# Patient Record
Sex: FEMALE | Race: BLACK OR AFRICAN AMERICAN | Employment: STUDENT | URBAN - METROPOLITAN AREA
[De-identification: names, ages, dates, MRNs, and addresses within clinical notes are randomized per-mention and may not be internally consistent; named-entity substitution may affect disease eponyms.]

---
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
## 2024-04-15 PROBLEM — R51.9 ACUTE NONINTRACTABLE HEADACHE, UNSPECIFIED HEADACHE TYPE: Status: ACTIVE | Noted: 2024-04-15

## 2024-04-19 ENCOUNTER — TELEPHONE (OUTPATIENT)
Dept: PEDIATRIC NEUROLOGY | Age: 19
End: 2024-04-19

## 2024-05-03 ENCOUNTER — APPOINTMENT (OUTPATIENT)
Dept: NEUROSURGERY | Age: 19
End: 2024-05-03

## 2024-05-03 ENCOUNTER — APPOINTMENT (OUTPATIENT)
Dept: PEDIATRIC NEUROLOGY | Age: 19
End: 2024-05-03

## 2024-05-03 VITALS
WEIGHT: 89 LBS | SYSTOLIC BLOOD PRESSURE: 126 MMHG | BODY MASS INDEX: 17.94 KG/M2 | DIASTOLIC BLOOD PRESSURE: 68 MMHG | HEIGHT: 59 IN | HEART RATE: 65 BPM

## 2024-05-03 VITALS — WEIGHT: 89.95 LBS

## 2024-05-03 DIAGNOSIS — G91.9 HYDROCEPHALUS, UNSPECIFIED TYPE  (CMD): Primary | ICD-10-CM

## 2024-05-03 DIAGNOSIS — G40.309 EPILEPSY, GENERALIZED, CONVULSIVE  (CMD): Primary | ICD-10-CM

## 2024-05-03 DIAGNOSIS — G80.9 CEREBRAL PALSY, UNSPECIFIED TYPE  (CMD): ICD-10-CM

## 2024-05-03 PROCEDURE — 99024 POSTOP FOLLOW-UP VISIT: CPT | Performed by: NURSE PRACTITIONER

## 2024-05-03 ASSESSMENT — ENCOUNTER SYMPTOMS
CONSTITUTIONAL NEGATIVE: 1
EYES NEGATIVE: 1
SEIZURES: 1
GASTROINTESTINAL NEGATIVE: 1
HEMATOLOGIC/LYMPHATIC NEGATIVE: 1
RESPIRATORY NEGATIVE: 1

## 2024-05-06 ENCOUNTER — TELEPHONE (OUTPATIENT)
Dept: NEUROSURGERY | Age: 19
End: 2024-05-06

## 2024-06-04 ENCOUNTER — APPOINTMENT (OUTPATIENT)
Dept: NEUROSURGERY | Age: 19
End: 2024-06-04

## 2024-06-04 ENCOUNTER — HOSPITAL ENCOUNTER (OUTPATIENT)
Dept: CT IMAGING | Age: 19
Discharge: HOME | End: 2024-06-04
Attending: NURSE PRACTITIONER

## 2024-06-04 VITALS — WEIGHT: 89.29 LBS

## 2024-06-04 DIAGNOSIS — G91.9 HYDROCEPHALUS, UNSPECIFIED TYPE  (CMD): ICD-10-CM

## 2024-06-04 DIAGNOSIS — G91.9 HYDROCEPHALUS, UNSPECIFIED TYPE  (CMD): Primary | ICD-10-CM

## 2024-06-04 PROCEDURE — 70450 CT HEAD/BRAIN W/O DYE: CPT

## 2024-06-04 PROCEDURE — 99024 POSTOP FOLLOW-UP VISIT: CPT | Performed by: NEUROLOGICAL SURGERY

## 2024-06-04 RX ORDER — PLECANATIDE 3 MG/1
3 TABLET ORAL DAILY
COMMUNITY
Start: 2024-04-30

## 2024-06-04 RX ORDER — MONTELUKAST SODIUM 10 MG/1
10 TABLET ORAL NIGHTLY
COMMUNITY
Start: 2024-05-18

## 2024-06-04 RX ORDER — LEVOCETIRIZINE DIHYDROCHLORIDE 5 MG/1
5 TABLET, FILM COATED ORAL EVERY EVENING
COMMUNITY
Start: 2024-05-29

## 2024-06-04 ASSESSMENT — ENCOUNTER SYMPTOMS
RESPIRATORY NEGATIVE: 1
HEMATOLOGIC/LYMPHATIC NEGATIVE: 1
GASTROINTESTINAL NEGATIVE: 1
EYES NEGATIVE: 1
CONSTITUTIONAL NEGATIVE: 1
ENDOCRINE NEGATIVE: 1
PSYCHIATRIC NEGATIVE: 1
ALLERGIC/IMMUNOLOGIC NEGATIVE: 1
VOMITING: 0

## 2024-07-03 ENCOUNTER — TELEPHONE (OUTPATIENT)
Dept: NEUROSURGERY | Age: 19
End: 2024-07-03

## 2024-07-09 DIAGNOSIS — G80.0 SPASTIC QUADRIPLEGIC CEREBRAL PALSY  (CMD): ICD-10-CM

## 2024-07-09 RX ORDER — CLONIDINE HYDROCHLORIDE 0.1 MG/1
0.2 TABLET ORAL NIGHTLY
Qty: 180 TABLET | Refills: 0 | Status: SHIPPED | OUTPATIENT
Start: 2024-07-09

## 2024-07-17 ENCOUNTER — CLINICAL ABSTRACT (OUTPATIENT)
Dept: HEALTH INFORMATION MANAGEMENT | Facility: OTHER | Age: 19
End: 2024-07-17

## 2024-07-18 ENCOUNTER — APPOINTMENT (OUTPATIENT)
Dept: NEUROSURGERY | Age: 19
End: 2024-07-18

## 2024-07-31 ENCOUNTER — TELEPHONE (OUTPATIENT)
Dept: NEUROLOGY | Age: 19
End: 2024-07-31

## 2024-08-06 ENCOUNTER — APPOINTMENT (OUTPATIENT)
Dept: NEUROLOGY | Age: 19
End: 2024-08-06

## 2024-08-08 ENCOUNTER — TELEPHONE (OUTPATIENT)
Dept: NEUROLOGY | Age: 19
End: 2024-08-08

## 2024-08-14 ENCOUNTER — APPOINTMENT (OUTPATIENT)
Dept: NEUROLOGY | Age: 19
End: 2024-08-14

## 2024-08-14 VITALS — BODY MASS INDEX: 18.95 KG/M2 | WEIGHT: 94 LBS | HEIGHT: 59 IN

## 2024-08-14 DIAGNOSIS — F41.9 ANXIETY: ICD-10-CM

## 2024-08-14 DIAGNOSIS — G40.209 FOCAL EPILEPSY WITH IMPAIRMENT OF CONSCIOUSNESS  (CMD): Primary | ICD-10-CM

## 2024-08-14 DIAGNOSIS — R56.9 SEIZURE  (CMD): ICD-10-CM

## 2024-08-14 DIAGNOSIS — T88.7XXA MEDICATION SIDE EFFECTS: ICD-10-CM

## 2024-08-14 DIAGNOSIS — G80.0 SPASTIC QUADRIPLEGIC CEREBRAL PALSY  (CMD): ICD-10-CM

## 2024-08-14 RX ORDER — PHENOBARBITAL 20 MG/5ML
ELIXIR ORAL
Qty: 3000 ML | Refills: 1 | Status: SHIPPED | OUTPATIENT
Start: 2024-08-14

## 2024-08-14 RX ORDER — CALCIUM CARBONATE/VITAMIN D3 500MG-5MCG
1 TABLET ORAL 2 TIMES DAILY
Qty: 90 TABLET | Refills: 1 | Status: SHIPPED | OUTPATIENT
Start: 2024-08-14

## 2024-08-14 RX ORDER — BISACODYL 10 MG/1
SUPPOSITORY RECTAL
COMMUNITY
Start: 2024-07-30

## 2024-08-14 RX ORDER — CLONIDINE HYDROCHLORIDE 0.1 MG/1
0.2 TABLET ORAL NIGHTLY
Qty: 180 TABLET | Refills: 1 | Status: SHIPPED | OUTPATIENT
Start: 2024-08-14

## 2024-08-14 RX ORDER — AZELAIC ACID 0.15 G/G
GEL TOPICAL
COMMUNITY
Start: 2024-07-30

## 2024-08-14 RX ORDER — ESCITALOPRAM OXALATE 10 MG/1
TABLET ORAL
Qty: 30 TABLET | Refills: 3 | Status: SHIPPED | OUTPATIENT
Start: 2024-08-14 | End: 2024-09-20

## 2024-08-14 ASSESSMENT — PAIN SCALES - GENERAL: PAINLEVEL: 0

## 2024-09-10 ENCOUNTER — APPOINTMENT (OUTPATIENT)
Dept: NEUROSURGERY | Age: 19
End: 2024-09-10

## 2024-10-22 ENCOUNTER — APPOINTMENT (OUTPATIENT)
Dept: GENERAL RADIOLOGY | Age: 19
End: 2024-10-22
Attending: NEUROLOGICAL SURGERY

## 2024-10-22 ENCOUNTER — APPOINTMENT (OUTPATIENT)
Dept: NEUROSURGERY | Age: 19
End: 2024-10-22

## 2024-10-22 VITALS
WEIGHT: 94 LBS | TEMPERATURE: 98.8 F | RESPIRATION RATE: 18 BRPM | HEIGHT: 59 IN | HEART RATE: 76 BPM | BODY MASS INDEX: 18.95 KG/M2 | SYSTOLIC BLOOD PRESSURE: 104 MMHG | DIASTOLIC BLOOD PRESSURE: 70 MMHG

## 2024-10-22 DIAGNOSIS — M41.25 OTHER IDIOPATHIC SCOLIOSIS, THORACOLUMBAR REGION: ICD-10-CM

## 2024-10-22 DIAGNOSIS — M41.45 NEUROMUSCULAR SCOLIOSIS OF THORACOLUMBAR REGION: Primary | ICD-10-CM

## 2024-10-22 DIAGNOSIS — G80.9 CEREBRAL PALSY, UNSPECIFIED TYPE  (CMD): ICD-10-CM

## 2024-10-22 DIAGNOSIS — Z09 FOLLOW-UP EXAMINATION: ICD-10-CM

## 2024-10-22 PROCEDURE — 99214 OFFICE O/P EST MOD 30 MIN: CPT

## 2024-10-22 PROCEDURE — 72082 X-RAY EXAM ENTIRE SPI 2/3 VW: CPT | Performed by: NEUROLOGICAL SURGERY

## 2024-11-05 ENCOUNTER — E-ADVICE (OUTPATIENT)
Dept: NEUROLOGY | Age: 19
End: 2024-11-05

## 2024-11-11 ENCOUNTER — APPOINTMENT (OUTPATIENT)
Dept: NEUROLOGY | Age: 19
End: 2024-11-11

## 2024-11-11 DIAGNOSIS — R56.9 SEIZURE  (CMD): ICD-10-CM

## 2024-11-11 DIAGNOSIS — T88.7XXA MEDICATION SIDE EFFECTS: ICD-10-CM

## 2024-11-11 DIAGNOSIS — G40.309 EPILEPSY, GENERALIZED, CONVULSIVE  (CMD): ICD-10-CM

## 2024-11-11 DIAGNOSIS — G40.209 FOCAL EPILEPSY WITH IMPAIRMENT OF CONSCIOUSNESS  (CMD): Primary | ICD-10-CM

## 2024-11-11 PROCEDURE — G2211 COMPLEX E/M VISIT ADD ON: HCPCS | Performed by: STUDENT IN AN ORGANIZED HEALTH CARE EDUCATION/TRAINING PROGRAM

## 2024-11-11 PROCEDURE — 99214 OFFICE O/P EST MOD 30 MIN: CPT | Performed by: STUDENT IN AN ORGANIZED HEALTH CARE EDUCATION/TRAINING PROGRAM

## 2024-11-11 RX ORDER — PHENOBARBITAL 20 MG/5ML
ELIXIR ORAL
Qty: 3000 ML | Refills: 3 | Status: SHIPPED | OUTPATIENT
Start: 2024-11-11

## 2024-11-11 RX ORDER — DOXYCYCLINE 100 MG/1
100 CAPSULE ORAL 2 TIMES DAILY
COMMUNITY

## 2025-04-04 DIAGNOSIS — G80.0 SPASTIC QUADRIPLEGIC CEREBRAL PALSY  (CMD): ICD-10-CM

## 2025-04-04 RX ORDER — CLONIDINE HYDROCHLORIDE 0.1 MG/1
0.2 TABLET ORAL NIGHTLY
Qty: 180 TABLET | Refills: 0 | OUTPATIENT
Start: 2025-04-04

## 2025-05-14 ENCOUNTER — TELEPHONE (OUTPATIENT)
Dept: NEUROSURGERY | Age: 20
End: 2025-05-14

## 2025-05-16 DIAGNOSIS — R56.9 SEIZURE  (CMD): ICD-10-CM

## 2025-05-16 DIAGNOSIS — G40.209 FOCAL EPILEPSY WITH IMPAIRMENT OF CONSCIOUSNESS (CMD): ICD-10-CM

## 2025-05-16 RX ORDER — PHENOBARBITAL 20 MG/5ML
ELIXIR ORAL
Qty: 3000 ML | Refills: 3 | Status: CANCELLED | OUTPATIENT
Start: 2025-05-16

## 2025-05-20 RX ORDER — PHENOBARBITAL 20 MG/5ML
ELIXIR ORAL
Qty: 3000 ML | Refills: 3 | Status: SHIPPED | OUTPATIENT
Start: 2025-05-20

## 2025-05-22 ENCOUNTER — TELEPHONE (OUTPATIENT)
Dept: NEUROLOGY | Age: 20
End: 2025-05-22

## 2025-05-28 ENCOUNTER — APPOINTMENT (OUTPATIENT)
Dept: NEUROLOGY | Age: 20
End: 2025-05-28

## 2025-05-28 VITALS
OXYGEN SATURATION: 100 % | SYSTOLIC BLOOD PRESSURE: 96 MMHG | HEIGHT: 56 IN | BODY MASS INDEX: 20.74 KG/M2 | WEIGHT: 92.2 LBS | HEART RATE: 71 BPM | DIASTOLIC BLOOD PRESSURE: 63 MMHG

## 2025-05-28 DIAGNOSIS — F91.9 BEHAVIOR DISTURBANCE: ICD-10-CM

## 2025-05-28 DIAGNOSIS — G80.0 SPASTIC QUADRIPLEGIC CEREBRAL PALSY  (CMD): ICD-10-CM

## 2025-05-28 DIAGNOSIS — G40.209 FOCAL EPILEPSY WITH IMPAIRMENT OF CONSCIOUSNESS (CMD): ICD-10-CM

## 2025-05-28 DIAGNOSIS — G40.309 EPILEPSY, GENERALIZED, CONVULSIVE  (CMD): ICD-10-CM

## 2025-05-28 DIAGNOSIS — F41.9 ANXIETY: ICD-10-CM

## 2025-05-28 DIAGNOSIS — T88.7XXA MEDICATION SIDE EFFECTS: ICD-10-CM

## 2025-05-28 DIAGNOSIS — Z51.81 THERAPEUTIC DRUG MONITORING: Primary | ICD-10-CM

## 2025-05-28 RX ORDER — TRETINOIN 0.25 MG/G
CREAM TOPICAL
COMMUNITY
Start: 2025-03-19

## 2025-05-28 RX ORDER — FLUOXETINE 10 MG/1
10 TABLET, FILM COATED ORAL 2 TIMES DAILY
COMMUNITY
Start: 2025-05-12

## 2025-05-28 RX ORDER — CALCIUM CARBONATE/VITAMIN D3 500MG-5MCG
1 TABLET ORAL 2 TIMES DAILY
Qty: 90 TABLET | Refills: 1 | Status: SHIPPED | OUTPATIENT
Start: 2025-05-28

## 2025-05-28 RX ORDER — LEUCOVORIN CALCIUM 25 MG/1
25 TABLET ORAL EVERY MORNING
COMMUNITY
Start: 2025-04-02

## 2025-05-28 RX ORDER — CLINDAMYCIN PHOSPHATE 10 UG/ML
LOTION TOPICAL
COMMUNITY

## 2025-05-28 RX ORDER — FLUTICASONE PROPIONATE 50 MCG
SPRAY, SUSPENSION (ML) NASAL
COMMUNITY
Start: 2025-05-27

## 2025-05-28 RX ORDER — QUETIAPINE FUMARATE 50 MG/1
50 TABLET, FILM COATED ORAL
COMMUNITY
Start: 2025-05-01

## 2025-05-28 RX ORDER — PHENOBARBITAL 64.8 MG/1
64.8 TABLET ORAL 2 TIMES DAILY
Qty: 180 TABLET | Refills: 1 | Status: SHIPPED | OUTPATIENT
Start: 2025-05-28

## 2025-05-30 ENCOUNTER — TELEPHONE (OUTPATIENT)
Dept: NEUROLOGY | Age: 20
End: 2025-05-30

## 2025-06-02 ENCOUNTER — E-ADVICE (OUTPATIENT)
Dept: NEUROLOGY | Age: 20
End: 2025-06-02

## 2025-06-04 ENCOUNTER — TELEPHONE (OUTPATIENT)
Dept: NEUROSURGERY | Age: 20
End: 2025-06-04

## 2025-06-05 ENCOUNTER — APPOINTMENT (OUTPATIENT)
Dept: NEUROSURGERY | Age: 20
End: 2025-06-05

## 2025-06-05 DIAGNOSIS — G91.9 HYDROCEPHALUS, UNSPECIFIED TYPE  (CMD): Primary | ICD-10-CM

## 2025-06-05 PROCEDURE — 99213 OFFICE O/P EST LOW 20 MIN: CPT | Performed by: NEUROLOGICAL SURGERY

## 2025-06-05 RX ORDER — PAROXETINE 20 MG/1
20 TABLET, FILM COATED ORAL EVERY MORNING
COMMUNITY

## 2025-06-05 ASSESSMENT — ENCOUNTER SYMPTOMS
GASTROINTESTINAL NEGATIVE: 1
EYES NEGATIVE: 1
ENDOCRINE NEGATIVE: 1
VOMITING: 0
ALLERGIC/IMMUNOLOGIC NEGATIVE: 1
PSYCHIATRIC NEGATIVE: 1
CONSTITUTIONAL NEGATIVE: 1
RESPIRATORY NEGATIVE: 1
HEMATOLOGIC/LYMPHATIC NEGATIVE: 1

## 2025-12-01 ENCOUNTER — APPOINTMENT (OUTPATIENT)
Dept: NEUROLOGY | Age: 20
End: 2025-12-01

## 2026-06-16 ENCOUNTER — APPOINTMENT (OUTPATIENT)
Dept: NEUROSURGERY | Age: 21
End: 2026-06-16

## (undated) NOTE — ED AVS SNAPSHOT
Zack Seen   MRN: [id number]    Department:  [facility] Emergency Department   Date of Visit:  6/28/2018           Disclosure     Insurance plans vary and the physician(s) referred by the ER may not be covered by your plan.  Please contact you tell this physician (or your personal doctor if your instructions are to return to your personal doctor) about any new or lasting problems. The primary care or specialist physician will see patients referred from the [facility] Emergency Department.  Salvadore Skiff

## (undated) NOTE — ED AVS SNAPSHOT
Grace Mynor   MRN: [id number]    Department:  [facility] Emergency Department   Date of Visit:  12/14/2018           Disclosure     Insurance plans vary and the physician(s) referred by the ER may not be covered by your plan.  Please contact yo tell this physician (or your personal doctor if your instructions are to return to your personal doctor) about any new or lasting problems. The primary care or specialist physician will see patients referred from the [facility] Emergency Department.  Panchito Keane

## (undated) NOTE — LETTER
December 11, 2017    Patient: Clara Dias   Date of Visit: 12/11/2017       To Whom It May Concern:    Alma Finney was seen and treated in our emergency department on 12/11/2017. She may return to school.     If you have any questions or concerns,

## (undated) NOTE — ED AVS SNAPSHOT
Grace Lowe   MRN: [id number]    Department:  [facility] Emergency Department   Date of Visit:  12/11/2017           Disclosure     Insurance plans vary and the physician(s) referred by the ER may not be covered by your plan.  Please contact yo tell this physician (or your personal doctor if your instructions are to return to your personal doctor) about any new or lasting problems. The primary care or specialist physician will see patients referred from the [facility] Emergency Department.  Santy Dent